# Patient Record
Sex: MALE | Race: WHITE | Employment: FULL TIME | URBAN - METROPOLITAN AREA
[De-identification: names, ages, dates, MRNs, and addresses within clinical notes are randomized per-mention and may not be internally consistent; named-entity substitution may affect disease eponyms.]

---

## 2017-11-29 ENCOUNTER — OFFICE VISIT (OUTPATIENT)
Dept: FAMILY MEDICINE CLINIC | Facility: CLINIC | Age: 57
End: 2017-11-29

## 2017-11-29 ENCOUNTER — HOSPITAL ENCOUNTER (OUTPATIENT)
Dept: GENERAL RADIOLOGY | Facility: HOSPITAL | Age: 57
Discharge: HOME | End: 2017-11-29
Attending: PHYSICIAN ASSISTANT
Payer: COMMERCIAL

## 2017-11-29 ENCOUNTER — TELEPHONE (OUTPATIENT)
Dept: FAMILY MEDICINE CLINIC | Facility: CLINIC | Age: 57
End: 2017-11-29

## 2017-11-29 VITALS
SYSTOLIC BLOOD PRESSURE: 122 MMHG | TEMPERATURE: 99 F | HEART RATE: 72 BPM | DIASTOLIC BLOOD PRESSURE: 68 MMHG | HEIGHT: 71.75 IN | RESPIRATION RATE: 12 BRPM | BODY MASS INDEX: 30.26 KG/M2 | WEIGHT: 221 LBS

## 2017-11-29 DIAGNOSIS — R07.9 LEFT SIDED CHEST PAIN: ICD-10-CM

## 2017-11-29 DIAGNOSIS — R07.9 LEFT SIDED CHEST PAIN: Primary | ICD-10-CM

## 2017-11-29 PROCEDURE — 99213 OFFICE O/P EST LOW 20 MIN: CPT | Performed by: PHYSICIAN ASSISTANT

## 2017-11-29 PROCEDURE — 71020 XR CHEST PA + LAT CHEST (CPT=71020): CPT | Performed by: PHYSICIAN ASSISTANT

## 2017-11-29 RX ORDER — IBUPROFEN 600 MG/1
600 TABLET ORAL EVERY 6 HOURS PRN
Qty: 30 TABLET | Refills: 0 | Status: SHIPPED | OUTPATIENT
Start: 2017-11-29 | End: 2018-02-17

## 2017-11-29 RX ORDER — HYDROCODONE BITARTRATE AND ACETAMINOPHEN 5; 325 MG/1; MG/1
1 TABLET ORAL EVERY 4 HOURS PRN
Qty: 10 TABLET | Refills: 0 | Status: SHIPPED | OUTPATIENT
Start: 2017-11-29 | End: 2018-02-17

## 2017-11-29 NOTE — TELEPHONE ENCOUNTER
Called and talked to patient this pain started yesterday in Left shoulder/chest worse when he is laying down has full ROM of left shoulder without pain but has pinpoint pain in left chest with DB discussed with Kavya HART for appointment

## 2017-11-29 NOTE — PROGRESS NOTES
CC:  Patient presents with:  Chest Pain      HPI: Ольга Keating presents with complaints of left chest and back pain since yesterday. He denies injury or increased physical activity. No SOB, wheezing, or radiation of the pain.  It only bothers him when he takes a d distress  HENT:  Head: Normocephalic, atraumatic  Ears: Normal external ears  Nose: No edema, bleeding, or rhinorrhea  Eyes: Pupils equal  Cardiovascular: RRR; no murmurs or extra sounds heard  Pulmonary/Chest: Lungs CTA.  No TTP over the chest wall; no soledad

## 2017-11-29 NOTE — H&P
Starting yesterday had pain in Lt shoulder and chest OK if standing worse when laying down full ROM of arm without pain.  Has pain in chest with Deep breath can pinpoint pain when he takes DB

## 2018-02-05 ENCOUNTER — TELEPHONE (OUTPATIENT)
Dept: FAMILY MEDICINE CLINIC | Facility: CLINIC | Age: 58
End: 2018-02-05

## 2018-02-05 DIAGNOSIS — Z13.0 SCREENING FOR ENDOCRINE, METABOLIC AND IMMUNITY DISORDER: ICD-10-CM

## 2018-02-05 DIAGNOSIS — Z13.29 SCREENING FOR ENDOCRINE, METABOLIC AND IMMUNITY DISORDER: ICD-10-CM

## 2018-02-05 DIAGNOSIS — Z13.228 SCREENING FOR ENDOCRINE, METABOLIC AND IMMUNITY DISORDER: ICD-10-CM

## 2018-02-05 DIAGNOSIS — Z12.5 SCREENING PSA (PROSTATE SPECIFIC ANTIGEN): ICD-10-CM

## 2018-02-05 DIAGNOSIS — E78.00 PURE HYPERCHOLESTEROLEMIA: Primary | ICD-10-CM

## 2018-02-05 NOTE — TELEPHONE ENCOUNTER
Pt has complete physical exam scheduled DOS 2/17/17 @ 8 am and requested labs be sent to [facility] \[facility] [place] [facility]\""

## 2018-02-11 LAB
ALBUMIN/GLOBULIN RATIO: 1.9 (CALC) (ref 1–2.5)
ALBUMIN: 4.4 G/DL (ref 3.6–5.1)
ALKALINE PHOSPHATASE: 77 U/L (ref 40–115)
ALT: 23 U/L (ref 9–46)
AST: 19 U/L (ref 10–35)
BILIRUBIN, TOTAL: 0.4 MG/DL (ref 0.2–1.2)
BUN: 18 MG/DL (ref 7–25)
CALCIUM: 9.3 MG/DL (ref 8.6–10.3)
CARBON DIOXIDE: 24 MMOL/L (ref 20–31)
CHLORIDE: 107 MMOL/L (ref 98–110)
CHOL/HDLC RATIO: 5 (CALC)
CHOLESTEROL, TOTAL: 257 MG/DL
CREATININE: 1.06 MG/DL (ref 0.7–1.33)
EGFR IF AFRICN AM: 90 ML/MIN/1.73M2
EGFR IF NONAFRICN AM: 78 ML/MIN/1.73M2
GLOBULIN: 2.3 G/DL (CALC) (ref 1.9–3.7)
GLUCOSE: 96 MG/DL (ref 65–99)
HDL CHOLESTEROL: 51 MG/DL
LDL-CHOLESTEROL: 180 MG/DL (CALC)
NON-HDL CHOLESTEROL: 206 MG/DL (CALC)
POTASSIUM: 4.4 MMOL/L (ref 3.5–5.3)
PROTEIN, TOTAL: 6.7 G/DL (ref 6.1–8.1)
PSA, TOTAL: 0.4 NG/ML
SODIUM: 139 MMOL/L (ref 135–146)
TRIGLYCERIDES: 125 MG/DL

## 2018-02-17 ENCOUNTER — OFFICE VISIT (OUTPATIENT)
Dept: FAMILY MEDICINE CLINIC | Facility: CLINIC | Age: 58
End: 2018-02-17

## 2018-02-17 VITALS
RESPIRATION RATE: 20 BRPM | HEIGHT: 71.5 IN | TEMPERATURE: 99 F | BODY MASS INDEX: 29.77 KG/M2 | WEIGHT: 217.38 LBS | HEART RATE: 86 BPM | DIASTOLIC BLOOD PRESSURE: 58 MMHG | SYSTOLIC BLOOD PRESSURE: 114 MMHG

## 2018-02-17 DIAGNOSIS — E78.00 PURE HYPERCHOLESTEROLEMIA: ICD-10-CM

## 2018-02-17 DIAGNOSIS — Z82.49 FAMILY HISTORY OF HEART ATTACK: ICD-10-CM

## 2018-02-17 DIAGNOSIS — L98.9 SKIN LESION OF FACE: ICD-10-CM

## 2018-02-17 DIAGNOSIS — Z00.00 ROUTINE GENERAL MEDICAL EXAMINATION AT A HEALTH CARE FACILITY: Primary | ICD-10-CM

## 2018-02-17 PROCEDURE — 99396 PREV VISIT EST AGE 40-64: CPT | Performed by: PHYSICIAN ASSISTANT

## 2018-02-17 NOTE — PROGRESS NOTES
Quincy Colbert is a 62year old male who presents for a complete physical exam. He has no complaints, but he does have a skin lesion on his face that his wife just noticed; he is not sure how long it has been there.    HPI:   Last colonoscopy:  2017--no polyps; 10 y Pancreatic age 58   • Hypertension Sister            REVIEW OF SYSTEMS:   General: Feels well otherwise  Skin: See HPI  Eyes: Denies vision changes; eye pain  HENT: Denies hearing changes, ST, dysphagia, or lymphadenitis  Pulmonary: Denies cough, hemoptysi lesion    (E78.00) Pure hypercholesterolemia  Plan: LIPID PANEL, LIPID PANEL  I advised low-cholesterol diet, high fiber intake, and regular exercise.  Recheck lipid panel in 6 months.            (L98.9) Skin lesion of face  Plan: DERM - INTERNAL

## 2019-04-30 ENCOUNTER — TELEPHONE (OUTPATIENT)
Dept: FAMILY MEDICINE CLINIC | Facility: CLINIC | Age: 59
End: 2019-04-30

## 2019-04-30 DIAGNOSIS — Z13.29 SCREENING FOR THYROID DISORDER: ICD-10-CM

## 2019-04-30 DIAGNOSIS — Z13.228 SCREENING FOR METABOLIC DISORDER: ICD-10-CM

## 2019-04-30 DIAGNOSIS — Z12.5 SCREENING FOR PROSTATE CANCER: ICD-10-CM

## 2019-04-30 DIAGNOSIS — Z13.0 SCREENING FOR BLOOD DISEASE: Primary | ICD-10-CM

## 2019-04-30 DIAGNOSIS — Z13.220 SCREENING FOR LIPID DISORDERS: ICD-10-CM

## 2019-04-30 NOTE — TELEPHONE ENCOUNTER
Please enter lab orders for the patient's upcoming physical appointment. Physical scheduled:    Your appointments     Date & Time Appointment Department [facility])    May 20, 2019  5:30 PM CDT Physical - Established Patient with LOGAN Polk

## 2019-07-18 LAB
% FREE PSA: 40 % (CALC)
ABSOLUTE BASOPHILS: 32 CELLS/UL (ref 0–200)
ABSOLUTE EOSINOPHILS: 221 CELLS/UL (ref 15–500)
ABSOLUTE LYMPHOCYTES: 1426 CELLS/UL (ref 850–3900)
ABSOLUTE MONOCYTES: 382 CELLS/UL (ref 200–950)
ABSOLUTE NEUTROPHILS: 2539 CELLS/UL (ref 1500–7800)
ALBUMIN/GLOBULIN RATIO: 2 (CALC) (ref 1–2.5)
ALBUMIN: 4.6 G/DL (ref 3.6–5.1)
ALKALINE PHOSPHATASE: 71 U/L (ref 40–115)
ALT: 27 U/L (ref 9–46)
AST: 21 U/L (ref 10–35)
BASOPHILS: 0.7 %
BILIRUBIN, TOTAL: 0.7 MG/DL (ref 0.2–1.2)
BUN: 23 MG/DL (ref 7–25)
CALCIUM: 9.4 MG/DL (ref 8.6–10.3)
CARBON DIOXIDE: 25 MMOL/L (ref 20–32)
CHLORIDE: 105 MMOL/L (ref 98–110)
CHOL/HDLC RATIO: 6 (CALC)
CHOLESTEROL, TOTAL: 304 MG/DL
CREATININE: 1.19 MG/DL (ref 0.7–1.33)
EGFR IF AFRICN AM: 78 ML/MIN/1.73M2
EGFR IF NONAFRICN AM: 67 ML/MIN/1.73M2
EOSINOPHILS: 4.8 %
FREE PSA: 0.2 NG/ML
GLOBULIN: 2.3 G/DL (CALC) (ref 1.9–3.7)
GLUCOSE: 106 MG/DL (ref 65–99)
HDL CHOLESTEROL: 51 MG/DL
HEMATOCRIT: 42.9 % (ref 38.5–50)
HEMOGLOBIN: 14.5 G/DL (ref 13.2–17.1)
LDL-CHOLESTEROL: 212 MG/DL (CALC)
LYMPHOCYTES: 31 %
MCH: 30.9 PG (ref 27–33)
MCHC: 33.8 G/DL (ref 32–36)
MCV: 91.3 FL (ref 80–100)
MONOCYTES: 8.3 %
MPV: 11 FL (ref 7.5–12.5)
NEUTROPHILS: 55.2 %
NON-HDL CHOLESTEROL: 253 MG/DL (CALC)
PLATELET COUNT: 205 THOUSAND/UL (ref 140–400)
POTASSIUM: 4.1 MMOL/L (ref 3.5–5.3)
PROTEIN, TOTAL: 6.9 G/DL (ref 6.1–8.1)
RDW: 12.4 % (ref 11–15)
RED BLOOD CELL COUNT: 4.7 MILLION/UL (ref 4.2–5.8)
SODIUM: 140 MMOL/L (ref 135–146)
TOTAL PSA: 0.5 NG/ML
TRIGLYCERIDES: 222 MG/DL
TSH W/REFLEX TO FT4: 2.81 MIU/L (ref 0.4–4.5)
WHITE BLOOD CELL COUNT: 4.6 THOUSAND/UL (ref 3.8–10.8)

## 2019-07-22 ENCOUNTER — OFFICE VISIT (OUTPATIENT)
Dept: FAMILY MEDICINE CLINIC | Facility: CLINIC | Age: 59
End: 2019-07-22
Payer: COMMERCIAL

## 2019-07-22 VITALS
HEART RATE: 72 BPM | SYSTOLIC BLOOD PRESSURE: 118 MMHG | BODY MASS INDEX: 30.67 KG/M2 | RESPIRATION RATE: 16 BRPM | HEIGHT: 71.5 IN | DIASTOLIC BLOOD PRESSURE: 68 MMHG | WEIGHT: 224 LBS | TEMPERATURE: 98 F

## 2019-07-22 DIAGNOSIS — E78.00 PURE HYPERCHOLESTEROLEMIA: ICD-10-CM

## 2019-07-22 DIAGNOSIS — Z00.00 ROUTINE PHYSICAL EXAMINATION: Primary | ICD-10-CM

## 2019-07-22 PROCEDURE — 99396 PREV VISIT EST AGE 40-64: CPT | Performed by: NURSE PRACTITIONER

## 2019-07-22 NOTE — PROGRESS NOTES
Eli Waggoner. is a 62year old male who presents for a complete physical exam.     HPI:   Pt has no acute complaints. Feels well. 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 Does not exercise routinely.  Has not been watching diet above. : yes. Children: yes-grown.   Exercise: minimal.  Diet: watches minimally  Smoker:  No    Cessation Advised:  No  Alcohol Use:  yes      Concerns:  no     Health Maintenance  Immunizations: Recommend flu vaccine for flu season 9760-0417, once no masses, HSM or CVA tenderness. : Bilat descended testes are smooth, non-tender, w/o masses/nodules. No penile lesions. No hernias felt  RECTAL: deferred  MUSCULOSKELETAL: muscle strength grade 5 to all extremities, back non-tender.  Patellar DTR 2+ bi lipids in 3-6 months.  Patient agress that if unable to get to acceptable levels by then he will try statin    Orders Placed This Encounter      LIPID PANEL [8850] [Q]      Meds & Refills for this Visit:  Requested Prescriptions      No prescriptions reques

## 2021-03-11 ENCOUNTER — HOSPITAL ENCOUNTER (OUTPATIENT)
Dept: GENERAL RADIOLOGY | Age: 61
Discharge: HOME | End: 2021-03-11
Attending: NURSE PRACTITIONER
Payer: COMMERCIAL

## 2021-03-11 ENCOUNTER — OFFICE VISIT (OUTPATIENT)
Dept: FAMILY MEDICINE CLINIC | Facility: CLINIC | Age: 61
End: 2021-03-11
Payer: COMMERCIAL

## 2021-03-11 ENCOUNTER — TELEPHONE (OUTPATIENT)
Dept: FAMILY MEDICINE CLINIC | Facility: CLINIC | Age: 61
End: 2021-03-11

## 2021-03-11 VITALS
SYSTOLIC BLOOD PRESSURE: 130 MMHG | HEART RATE: 96 BPM | WEIGHT: 225.38 LBS | RESPIRATION RATE: 16 BRPM | DIASTOLIC BLOOD PRESSURE: 80 MMHG | HEIGHT: 72 IN | BODY MASS INDEX: 30.53 KG/M2 | TEMPERATURE: 95 F
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
## 2021-03-11 DIAGNOSIS — M25.512 ACUTE PAIN OF LEFT SHOULDER: Primary | ICD-10-CM

## 2021-03-11 DIAGNOSIS — R11.0 NAUSEA: ICD-10-CM

## 2021-03-11 DIAGNOSIS — M25.512 ACUTE PAIN OF LEFT SHOULDER: ICD-10-CM

## 2021-03-11 PROCEDURE — 99213 OFFICE O/P EST LOW 20 MIN: CPT | Performed by: NURSE PRACTITIONER

## 2021-03-11 PROCEDURE — 3075F SYST BP GE 130 - 139MM HG: CPT | Performed by: NURSE PRACTITIONER

## 2021-03-11 PROCEDURE — 73030 X-RAY EXAM OF SHOULDER: CPT | Performed by: NURSE PRACTITIONER

## 2021-03-11 PROCEDURE — 3079F DIAST BP 80-89 MM HG: CPT | Performed by: NURSE PRACTITIONER

## 2021-03-11 PROCEDURE — 3008F BODY MASS INDEX DOCD: CPT | Performed by: NURSE PRACTITIONER

## 2021-03-11 RX ORDER — CYCLOBENZAPRINE HCL 5 MG
5 TABLET ORAL NIGHTLY PRN
Qty: 10 TABLET | Refills: 0 | Status: SHIPPED | OUTPATIENT
Start: 2021-03-11 | End: 2021-08-17 | Stop reason: ALTCHOICE

## 2021-03-11 RX ORDER — METHYLPREDNISOLONE 4 MG/1
TABLET ORAL
Qty: 1 KIT | Refills: 0 | Status: SHIPPED | OUTPATIENT
Start: 2021-03-11 | End: 2021-08-17 | Stop reason: ALTCHOICE

## 2021-03-11 NOTE — PROGRESS NOTES
Patient presents with:  Fall: 3 weeks ago, left shoulder pain that moved into neck causing a headach       HPI:  Presents with approx 3 week history of left posterior shoulder pain that started after a fall on snow/ice where he landed on left shoulder.  Kim Rivas Mild TTP over left posterior neck at base of skull region w/o masses, edema, erythema, deformity. Abd: soft, non-distended, non-TTP, w/o guarding, rebound, masses or appreciable organomegaly. BS(+)x4- normoactive.   MS: left shoulder TTP along distal scap Medrol dose pack prescribed today. Instructions for taking are located on medication packaging. For the first day, take all 6 tablets at one time.    Starting with day 2 take tablets as indicated on packaging material.   Do not take Advil, M

## 2021-03-11 NOTE — PATIENT INSTRUCTIONS
Medrol dose pack prescribed today. Instructions for taking are located on medication packaging. For the first day, take all 6 tablets at one time.    Starting with day 2 take tablets as indicated on packaging material.   Do not take Advil, Mot

## 2021-03-11 NOTE — TELEPHONE ENCOUNTER
Pt states he is at the pharmacy because he seen Titus today. Pt states they only have on medication on file. Pt states Titus was supposed to send two medications but he does not know the name of the medication or why he was prescribing the medication.

## 2021-03-11 NOTE — TELEPHONE ENCOUNTER
Should have script for Medrol dose pack and cyclobenzaprine. I resent Medrol. Please let him know.  Thanks

## 2021-03-20 ENCOUNTER — IMMUNIZATION (OUTPATIENT)
Dept: LAB | Facility: HOSPITAL | Age: 61
End: 2021-03-20
Attending: EMERGENCY MEDICINE
Payer: COMMERCIAL

## 2021-03-20 DIAGNOSIS — Z23 NEED FOR VACCINATION: Primary | ICD-10-CM

## 2021-03-20 PROCEDURE — 0011A SARSCOV2 VAC 100MCG/0.5ML IM: CPT

## 2021-04-12 ENCOUNTER — TELEPHONE (OUTPATIENT)
Dept: PHYSICAL THERAPY | Facility: HOSPITAL | Age: 61
End: 2021-04-12

## 2021-04-14 ENCOUNTER — OFFICE VISIT (OUTPATIENT)
Dept: PHYSICAL THERAPY | Facility: HOSPITAL | Age: 61
End: 2021-04-14
Attending: NURSE PRACTITIONER
Payer: COMMERCIAL

## 2021-04-14 DIAGNOSIS — M25.512 ACUTE PAIN OF LEFT SHOULDER: ICD-10-CM

## 2021-04-14 PROCEDURE — 97162 PT EVAL MOD COMPLEX 30 MIN: CPT

## 2021-04-14 PROCEDURE — 97110 THERAPEUTIC EXERCISES: CPT

## 2021-04-14 NOTE — PROGRESS NOTES
SHOULDER EVALUATION:   Referring Physician: Dr. Alma Esquivel  Diagnosis: Acute pain of left shoulder     Date of Service: 4/14/2021     Rafa Aden. is a 61year old male who presents to therapy today with complaints of L shoulder and arm pa flexion/extension. Pain in the forearm is worse at night, wakes him up, must sleep with it propped on pillows. Not taking pain medication. Forearm pain is generally constant.    Current functional limitations include moving arm into full extension, sleeping TTP L SOs, mm bellies wrist extensors, and L supraspinatus   Sensation: light touch intact with changing tingling in forearm and hand  Cervical Screen: limited motion cervical rotation and SB L, with mild pain C3-supraspinatus, not changing or recreating p Complexity, Therex x 1     Total Timed Treatment: 10 min     Total Treatment Time: 50 min     Based on clinical rationale and outcome measures, this evaluation involved Moderate Complexity decision making due to 1-2 personal factors/comorbidities, 3 body s required: Yes  I certify the need for these services furnished under this plan of treatment and while under my care.     X___________________________________________________ Date____________________    Certification From: 0/86/2585  To:7/13/2021

## 2021-04-17 ENCOUNTER — IMMUNIZATION (OUTPATIENT)
Dept: LAB | Facility: HOSPITAL | Age: 61
End: 2021-04-17
Attending: EMERGENCY MEDICINE
Payer: COMMERCIAL

## 2021-04-17 DIAGNOSIS — Z23 NEED FOR VACCINATION: Primary | ICD-10-CM

## 2021-04-17 PROCEDURE — 0012A SARSCOV2 VAC 100MCG/0.5ML IM: CPT

## 2021-04-19 ENCOUNTER — OFFICE VISIT (OUTPATIENT)
Dept: PHYSICAL THERAPY | Facility: HOSPITAL | Age: 61
End: 2021-04-19
Attending: NURSE PRACTITIONER
Payer: COMMERCIAL

## 2021-04-19 PROCEDURE — 97110 THERAPEUTIC EXERCISES: CPT

## 2021-04-19 PROCEDURE — 97140 MANUAL THERAPY 1/> REGIONS: CPT

## 2021-04-19 NOTE — PROGRESS NOTES
Dx: Acute pain of left shoulder           Insurance (Authorized # of Visits):  10 requested            Authorizing Physician: Dr. Pepe Rangel  Next MD visit: none scheduled  Fall Risk: standard         Precautions: n/a             Subjective: Patient states that abduction AROM to 170 deg or better without pain in order to improve ability to reach in diagonals. 5. Patient will be IND and compliant in HEP to maintain progress made in PT. - issued and progressed    Plan: Continue PT with progression as indicated.

## 2021-04-21 ENCOUNTER — OFFICE VISIT (OUTPATIENT)
Dept: PHYSICAL THERAPY | Facility: HOSPITAL | Age: 61
End: 2021-04-21
Attending: NURSE PRACTITIONER
Payer: COMMERCIAL

## 2021-04-21 PROCEDURE — 97110 THERAPEUTIC EXERCISES: CPT

## 2021-04-21 PROCEDURE — 97140 MANUAL THERAPY 1/> REGIONS: CPT

## 2021-04-21 NOTE — PROGRESS NOTES
Dx: Acute pain of left shoulder           Insurance (Authorized # of Visits):  10 requested            Authorizing Physician: Dr. Janae Marie  Next MD visit: none scheduled  Fall Risk: standard         Precautions: n/a             Subjective: Patient states that progressed    Plan: Continue PT with progression as indicated. Date: 4/19/2021  TX#: 2/10 Date:  4/21/21               TX#: 3/10 Date:                 TX#: 4/ Date:                 TX#: 5/ Date:    Tx#: 6/   Manual  - STM L shoulder, global  - PROM L shou

## 2021-04-26 ENCOUNTER — OFFICE VISIT (OUTPATIENT)
Dept: PHYSICAL THERAPY | Facility: HOSPITAL | Age: 61
End: 2021-04-26
Attending: NURSE PRACTITIONER
Payer: COMMERCIAL

## 2021-04-26 PROCEDURE — 97140 MANUAL THERAPY 1/> REGIONS: CPT

## 2021-04-26 PROCEDURE — 97110 THERAPEUTIC EXERCISES: CPT

## 2021-04-26 NOTE — PROGRESS NOTES
Dx: Acute pain of left shoulder           Insurance (Authorized # of Visits):  10 requested            Authorizing Physician: Dr. Charles Sánchez  Next MD visit: none scheduled  Fall Risk: standard         Precautions: n/a             Subjective: Patient states that abduction AROM to 170 deg or better without pain in order to improve ability to reach in diagonals. - progress   5.  Patient will be IND and compliant in HEP to maintain progress made in PT. - issued and progressed    Plan: Continue PT with progression as i glide, supine cane flexion, standing TANNER flexion to 90, RTB rows, low corner pec stretch    Charges:  Therex x 2, Manual x 1       Total Timed Treatment: 45 min  Total Treatment Time: 45 min

## 2021-04-28 ENCOUNTER — OFFICE VISIT (OUTPATIENT)
Dept: PHYSICAL THERAPY | Facility: HOSPITAL | Age: 61
End: 2021-04-28
Attending: NURSE PRACTITIONER
Payer: COMMERCIAL

## 2021-04-28 PROCEDURE — 97110 THERAPEUTIC EXERCISES: CPT

## 2021-04-28 PROCEDURE — 97140 MANUAL THERAPY 1/> REGIONS: CPT

## 2021-04-28 NOTE — PROGRESS NOTES
Dx: Acute pain of left shoulder           Insurance (Authorized # of Visits):  10 requested            Authorizing Physician: Dr. Tavares Westbrook  Next MD visit: none scheduled  Fall Risk: standard         Precautions: n/a             Subjective: Patient states that diagonals. - progress   5. Patient will be IND and compliant in HEP to maintain progress made in PT. - issued and progressed    Plan: Continue PT with progression as indicated. Supine pec stretch on foam roller as opposed to doorway/corner.    Date: 4/19/20 stretch 3 x 20 sec   - standing abd/scaption waves in mirror, no hiking x 12  - RTB rows 2 x 10 Therex   - shoulder flexion pulleys AROM x 3 min  - shoulder abduction pulleys AROM x 2 min  - standing abd arcs in mirror x 10 (much improved)  - standing lat

## 2021-05-03 ENCOUNTER — OFFICE VISIT (OUTPATIENT)
Dept: PHYSICAL THERAPY | Facility: HOSPITAL | Age: 61
End: 2021-05-03
Attending: NURSE PRACTITIONER
Payer: COMMERCIAL

## 2021-05-03 PROCEDURE — 97110 THERAPEUTIC EXERCISES: CPT

## 2021-05-03 PROCEDURE — 97140 MANUAL THERAPY 1/> REGIONS: CPT

## 2021-05-03 NOTE — PROGRESS NOTES
Dx: Acute pain of left shoulder           Insurance (Authorized # of Visits):  10 requested            Authorizing Physician: Dr. Randolph Porter  Next MD visit: none scheduled  Fall Risk: standard         Precautions: n/a             Subjective: Patient states that visits)   1. Patient will report improved ability to sleep at night without waking due to pain or need to use pillows to prop up his arm. 2. Patient will improve L shoulder strength to 5/5 throughout in order to lift a gallon of milk without pain.  - prog flexion with supination, slow x 10  - SL abd with thumb up AROM to ~90 deg x 4, x 8  - SL ER AROM with towel roll AROM x 12  - SL horiz abd AROM x 12 (popping)  - supine shoulder flexion to 90 (too easy), progressed to standing flexion to 90, 2# TANNER (scpat

## 2021-05-05 ENCOUNTER — OFFICE VISIT (OUTPATIENT)
Dept: PHYSICAL THERAPY | Facility: HOSPITAL | Age: 61
End: 2021-05-05
Attending: NURSE PRACTITIONER
Payer: COMMERCIAL

## 2021-05-05 PROCEDURE — 97140 MANUAL THERAPY 1/> REGIONS: CPT

## 2021-05-05 PROCEDURE — 97110 THERAPEUTIC EXERCISES: CPT

## 2021-05-05 NOTE — PROGRESS NOTES
Dx: Acute pain of left shoulder           Insurance (Authorized # of Visits):  10 requested            Authorizing Physician: Dr. Enoc Sherwood  Next MD visit: none scheduled  Fall Risk: standard         Precautions: n/a             Subjective: Patient states that Patient will report improved ability to sleep at night without waking due to pain or need to use pillows to prop up his arm. 2. Patient will improve L shoulder strength to 5/5 throughout in order to lift a gallon of milk without pain. - progress  3.  Stacy distal biceps, biceps insertion  - low median nerve glides   - PA glide over distal humerus, gr III/IV  - SL scap mobs for general mobility    Therex   - supine cane flexion with supination, slow x 10  - SL abd with thumb up AROM to ~90 deg x 4, x 8  - SL roll 2 x 10  - 2# ball passes around body to R and L x 15 ea  - abd arcs OH pass 2# ball x 10 R/L   - - - - - -   - - - - - -   HEP: GTB rows, GTB TANNER ext, RTB ER, standing flexion to 90 3#, standing scaption to 90 3#    Charges:  Therex x 2, Manual x 1

## 2021-05-12 ENCOUNTER — OFFICE VISIT (OUTPATIENT)
Dept: PHYSICAL THERAPY | Facility: HOSPITAL | Age: 61
End: 2021-05-12
Attending: NURSE PRACTITIONER
Payer: COMMERCIAL

## 2021-05-12 PROCEDURE — 97140 MANUAL THERAPY 1/> REGIONS: CPT

## 2021-05-12 PROCEDURE — 97110 THERAPEUTIC EXERCISES: CPT

## 2021-05-12 NOTE — PROGRESS NOTES
Dx: Acute pain of left shoulder           Insurance (Authorized # of Visits):  10 requested            Authorizing Physician: Dr. Tavares Westbrook  Next MD visit: none scheduled  Fall Risk: standard         Precautions: n/a             Subjective: Patient states that hard on this over the weekend, reiterated importance of appropriate grading with HEP. Goals: (To be met in 10 visits)   1. Patient will report improved ability to sleep at night without waking due to pain or need to use pillows to prop up his arm.    2. humerus, gr III/IV  - inferior clavicular glide, MWM with shoulder flexion and abd  - SL scap mobs for general mobility  Manual   - STM L anterior elbow, distal biceps, biceps insertion  - low median nerve glides   - PA glide over distal humerus, gr III/IV around body to R and L x 15 ea  - supine pec stretch on foam roll, 3 x 20 sec Therex   - shoulder flexion pulleys AROM x 2 min  - shoulder abduction pulleys AROM x 3 min  - standing alternating flexion/scaption, 3# TANNER 2 x 10 ea (alt with OH press)  - pili

## 2021-05-20 ENCOUNTER — OFFICE VISIT (OUTPATIENT)
Dept: PHYSICAL THERAPY | Facility: HOSPITAL | Age: 61
End: 2021-05-20
Attending: NURSE PRACTITIONER
Payer: COMMERCIAL

## 2021-05-20 PROCEDURE — 97110 THERAPEUTIC EXERCISES: CPT

## 2021-05-20 PROCEDURE — 97140 MANUAL THERAPY 1/> REGIONS: CPT

## 2021-05-20 NOTE — PROGRESS NOTES
Dx: Acute pain of left shoulder           Insurance (Authorized # of Visits):  10 requested            Authorizing Physician: Dr. Shaun Covarrubias  Next MD visit: none scheduled  Fall Risk: standard         Precautions: n/a             Subjective: Patient states that full with manual (5/20/21)      Assessment: Focused this session on manual in attempt to decrease pain of the forearm/wrist and decrease pain.  Patient reporting decreasing symptoms with mobilization at the elbow and wrist, though unable to significantly ch nerve glides  Manual  - STM L shoulder, global  - PROM L shoulder flexion, abduction, ER, IR, no notable overpressure given, gradual increase  - manual low-mod median nerve glides   - inferior glides L shoulder gr III Manual  - STM L shoulder, global  - NV flexion x ~2 min Therex   - shoulder flexion pulleys AROM x 2 min  - shoulder abduction pulleys AROM x 2 min  - supine cane flexion with 4# wt x 15, slow  - corner pec stretch, low 3 x 30 sec  - doorway elbow extension stretch 3 x 20 sec   - standing abd/s stretch    Charges:  Therex x 1, Manual x 2       Total Timed Treatment: 45 min  Total Treatment Time: 45 min

## 2021-05-27 ENCOUNTER — OFFICE VISIT (OUTPATIENT)
Dept: PHYSICAL THERAPY | Facility: HOSPITAL | Age: 61
End: 2021-05-27
Attending: NURSE PRACTITIONER
Payer: COMMERCIAL

## 2021-05-27 PROCEDURE — 97110 THERAPEUTIC EXERCISES: CPT

## 2021-05-27 PROCEDURE — 97140 MANUAL THERAPY 1/> REGIONS: CPT

## 2021-05-27 NOTE — PROGRESS NOTES
Progress Summary  Pt has attended 10 visits in Physical Therapy.      Dx: Acute pain of left [employer] (Authorized # of Visits):  10 requested            Authorizing Physician: Dr. Jaylon Hager  Next MD visit: none scheduled  Fall Risk: standard pain and tingling also generally resolved at this time. Patient responds well to shoulder pulleys, advised use at home. Strength also continues to improve, but is limited most in abduction, ER, and scapular strength.  Will continue to address this in [facility] this plan of treatment and while under my care.     X___________________________________________________ Date____________________    Certification From: 5/38/6062  To:8/25/2021     Date:  4/21/21               TX#: 3/10 Date: 4/26/21               TX#: 4/10 inferior clavicular glide, MWM with shoulder flexion and abd  - MWM inferior glide into abduction  - slow PROM abd to full mobility  - SL scap mobs for general mobility    Therex   - supine cane flexion with supination, slow x 10  - SL ER with towel roll, elbows in, occasional holds x 10  - YTB TANNER horiz abd/ER with flexion x 10  - cane behind back IR stretch x 10 Therex   - shoulder flexion pulleys AROM x 2 min  - shoulder abduction pulleys AROM x 2 min  - standing abduction waves in mirror, TC cues to [employer]

## 2021-06-03 ENCOUNTER — OFFICE VISIT (OUTPATIENT)
Dept: PHYSICAL THERAPY | Facility: HOSPITAL | Age: 61
End: 2021-06-03
Attending: EMERGENCY MEDICINE
Payer: COMMERCIAL

## 2021-06-03 PROCEDURE — 97140 MANUAL THERAPY 1/> REGIONS: CPT

## 2021-06-03 PROCEDURE — 97110 THERAPEUTIC EXERCISES: CPT

## 2021-06-03 NOTE — PROGRESS NOTES
Dx: Acute pain of left shoulder           Insurance (Authorized # of Visits):  10 requested            Authorizing Physician: Dr. Linda Dhillon Next MD visit: none scheduled  Fall Risk: standard         Precautions: n/a             Subjective: Patient states that h Goals: (To be met in 18 visits)   1. Patient will report improved ability to sleep at night without waking due to pain or need to use pillows to prop up his arm. - MET  2.  Patient will improve L shoulder strength to 5/5 throughout in order to lift insertion  - SL scap mobs for general mobility   - PROM L shoulder flexion, abduction, ER, IR, no notable overpressure given, gradual increase Manual   - STM L anterior elbow, anterior forearm, wrist  - low median nerve glides   - AP glide over distal hume press)  - standing pronated 12# bar curl into OH press 2 x 10 (alt with flex/scap)  - standing RTB ER with towel roll 2 x 10  - 2# ball passes around body to R and L x 15 ea  - abd arcs OH pass 2# ball x 10 R/L Therex   - shoulder flexion pulleys AROM x 2

## 2021-06-14 ENCOUNTER — OFFICE VISIT (OUTPATIENT)
Dept: PHYSICAL THERAPY | Facility: HOSPITAL | Age: 61
End: 2021-06-14
Attending: EMERGENCY MEDICINE
Payer: COMMERCIAL

## 2021-06-14 PROCEDURE — 97140 MANUAL THERAPY 1/> REGIONS: CPT

## 2021-06-14 PROCEDURE — 97110 THERAPEUTIC EXERCISES: CPT

## 2021-06-14 NOTE — PROGRESS NOTES
Dx: Acute pain of left shoulder           Insurance (Authorized # of Visits):  10 requested            Authorizing Physician: Dr. Payton Blancas Next MD visit: none scheduled  Fall Risk: standard         Precautions: n/a             Subjective: Patient has not been met in 18 visits)   1. Patient will report improved ability to sleep at night without waking due to pain or need to use pillows to prop up his arm. - MET  2.  Patient will improve L shoulder strength to 5/5 throughout in order to lift a gallon of milk witho humerus, gr III/IV  - AP glides global wrist with non-purposeful cavitation, gr III  - inferior clavicular glide, MWM with shoulder flexion and abd  - slow PROM abd to full mobility  - SL scap mobs for general mobility  Manual  - inferior clavicular glide, dynamic rhythmic stab 3 x ~30 sec  - wall push-ups, elbows in, occasional holds x 10  - YTB TANNER horiz abd/ER with flexion x 10  - cane behind back IR stretch x 10 Therex   - shoulder flexion pulleys AROM x 2 min  - shoulder abduction pulleys AROM x 2 min

## 2021-06-22 ENCOUNTER — OFFICE VISIT (OUTPATIENT)
Dept: PHYSICAL THERAPY | Facility: HOSPITAL | Age: 61
End: 2021-06-22
Attending: EMERGENCY MEDICINE
Payer: COMMERCIAL

## 2021-06-22 PROCEDURE — 97140 MANUAL THERAPY 1/> REGIONS: CPT

## 2021-06-22 PROCEDURE — 97110 THERAPEUTIC EXERCISES: CPT

## 2021-06-22 NOTE — PROGRESS NOTES
Dx: Acute pain of left shoulder           Insurance (Authorized # of Visits):  10 requested            Authorizing Physician: Dr. Shahzad Caballero Next MD visit: none scheduled  Fall Risk: standard         Precautions: n/a             Subjective: Patient states that h increased prone ER to 5#, had been doing 1-2# in session, noticed that pain started to increase around the time that he did this. Noted TTP pec insertion and was able to significantly reduce discomfort with graded pressure/trigger pt release.  Advised to RAMIREZ ELIZABETH glides   - PA glide over distal humerus, gr III/IV  - SL scap mobs for general mobility  Manual   - STM L anterior elbow, distal biceps, biceps insertion  - SL scap mobs for general mobility   - PROM L shoulder flexion, abduction, ER, IR, no notable overpr pulleys AROM x 3 min  - standing alternating flexion/scaption, 3# TANNER 2 x 10 ea (alt with OH press)  - standing pronated 12# bar curl into OH press 2 x 10 (alt with flex/scap)  - standing RTB ER with towel roll 2 x 10  - 2# ball passes around body to R and TANNER x 10  - assessing ability to \"draw back bow\" with BTB Therex  - UBE random hills x 2' fwd/2' retro   - behind head butterfly ER/pec stretch, multiple bouts with therapist assist as needed  - discussion, edu POC and HEP   - - - - - - - -   - - - - - -

## 2021-07-29 ENCOUNTER — OFFICE VISIT (OUTPATIENT)
Dept: PHYSICAL THERAPY | Facility: HOSPITAL | Age: 61
End: 2021-07-29
Attending: NURSE PRACTITIONER
Payer: COMMERCIAL

## 2021-07-29 PROCEDURE — 97140 MANUAL THERAPY 1/> REGIONS: CPT

## 2021-07-29 PROCEDURE — 97110 THERAPEUTIC EXERCISES: CPT

## 2021-07-29 NOTE — PROGRESS NOTES
Progress Summary  Pt has attended 14 visits in Physical Therapy.      Dx: Acute pain of left [employer] (Authorized # of Visits):  10 requested            Authorizing Physician: Dr. Jason Lott Next MD visit: none scheduled  Fall Risk: standard and was able to improve mobility with manual and self stretching, though still min limited in ER following. Noted increased mm tension pec major insertion that improved with STM and graded pressure release.  Updated HEP to reflect his continued reported iss and has agreed to actively participate in planning and for this course of care. Thank you for your referral. If you have any questions, please contact me at Dept: [phone].     Sincerely,  Electronically signed by therapist: Sandy Garzon, PT, DPT    P humerus, gr III/IV  - MWM inferior glide into abduction  - slow PROM abd to full mobility  - seated ER 90/90 inferior glide   - prone PA glides with and without MWM with ER Manual  - PA glide over distal humerus, gr III/IV  - MWM inferior glide into abduct Therex  - UBE L4 x 2.5 min fwd/2.5 min retro  - TRX flexion walkouts with 5-10 sec holds x 10  - ER stretch with cane 5 x 20 sec  - seated 90/90 ER, 0# x 10, 2# 2 x 10, focus on slow control   - prone TANNER 90/90 ER AROM x ~20, 2# x 10 (feels very weak)  - Y

## 2021-08-17 ENCOUNTER — OFFICE VISIT (OUTPATIENT)
Dept: FAMILY MEDICINE CLINIC | Facility: CLINIC | Age: 61
End: 2021-08-17
Payer: COMMERCIAL

## 2021-08-17 ENCOUNTER — TELEPHONE (OUTPATIENT)
Dept: FAMILY MEDICINE CLINIC | Facility: CLINIC | Age: 61
End: 2021-08-17

## 2021-08-17 VITALS
HEART RATE: 70 BPM | DIASTOLIC BLOOD PRESSURE: 80 MMHG | TEMPERATURE: 98 F | RESPIRATION RATE: 16 BRPM | BODY MASS INDEX: 31.27 KG/M2 | WEIGHT: 233.38 LBS | HEIGHT: 72.44 IN | SYSTOLIC BLOOD PRESSURE: 110 MMHG

## 2021-08-17 DIAGNOSIS — Z13.228 SCREENING FOR METABOLIC DISORDER: ICD-10-CM

## 2021-08-17 DIAGNOSIS — Z13.29 SCREENING FOR THYROID DISORDER: ICD-10-CM

## 2021-08-17 DIAGNOSIS — Z12.5 SCREENING FOR PROSTATE CANCER: ICD-10-CM

## 2021-08-17 DIAGNOSIS — Z13.220 SCREENING FOR LIPID DISORDERS: ICD-10-CM

## 2021-08-17 DIAGNOSIS — R21 RASH: Primary | ICD-10-CM

## 2021-08-17 DIAGNOSIS — Z13.0 SCREENING FOR BLOOD DISEASE: Primary | ICD-10-CM

## 2021-08-17 PROCEDURE — 3079F DIAST BP 80-89 MM HG: CPT | Performed by: NURSE PRACTITIONER

## 2021-08-17 PROCEDURE — 3008F BODY MASS INDEX DOCD: CPT | Performed by: NURSE PRACTITIONER

## 2021-08-17 PROCEDURE — 3074F SYST BP LT 130 MM HG: CPT | Performed by: NURSE PRACTITIONER

## 2021-08-17 PROCEDURE — 99213 OFFICE O/P EST LOW 20 MIN: CPT | Performed by: NURSE PRACTITIONER

## 2021-08-17 RX ORDER — METHYLPREDNISOLONE 4 MG/1
TABLET ORAL
Qty: 21 TABLET | Refills: 0 | Status: SHIPPED | OUTPATIENT
Start: 2021-08-17 | End: 2021-09-01 | Stop reason: ALTCHOICE

## 2021-08-17 NOTE — PROGRESS NOTES
Patient presents with:  Rash: rash that itches and burns, legs, arms and starting on face       HPI:  Presents with approx 5-6 day history of worsening rash. Stated was fishing and got some alge from pond on his skin.  That night noted some itchy red spots improved in 6-7 days or if symptoms worsen. Verbalized understanding of instructions and agreeable to this plan of care. No orders of the defined types were placed in this encounter.       Meds & Refills for this Visit:  Requested Prescriptions     S

## 2021-08-17 NOTE — TELEPHONE ENCOUNTER
Please enter lab orders for the patient's upcoming physical appointment. Physical scheduled:    Your appointments     Date & Time Appointment Department [facility])    Sep 01, 2021  2:45 PM CDT Physical - Established with Richard Corona NP [facility]

## 2021-08-17 NOTE — PATIENT INSTRUCTIONS
Medrol dose pack prescribed today. Instructions for taking are located on medication packaging. For the first day, take all 6 tablets at one time.    Starting with day 2 take tablets as indicated on packaging material.   Do not t

## 2021-08-26 LAB
ABSOLUTE BASOPHILS: 47 CELLS/UL (ref 0–200)
ABSOLUTE EOSINOPHILS: 261 CELLS/UL (ref 15–500)
ABSOLUTE LYMPHOCYTES: 1956 CELLS/UL (ref 850–3900)
ABSOLUTE MONOCYTES: 509 CELLS/UL (ref 200–950)
ABSOLUTE NEUTROPHILS: 3926 CELLS/UL (ref 1500–7800)
ALBUMIN/GLOBULIN RATIO: 1.9 (CALC) (ref 1–2.5)
ALBUMIN: 4.6 G/DL (ref 3.6–5.1)
ALKALINE PHOSPHATASE: 73 U/L (ref 35–144)
ALT: 22 U/L (ref 9–46)
AST: 16 U/L (ref 10–35)
BASOPHILS: 0.7 %
BILIRUBIN, TOTAL: 0.9 MG/DL (ref 0.2–1.2)
BUN: 16 MG/DL (ref 7–25)
CALCIUM: 9.3 MG/DL (ref 8.6–10.3)
CARBON DIOXIDE: 25 MMOL/L (ref 20–32)
CHLORIDE: 104 MMOL/L (ref 98–110)
CHOL/HDLC RATIO: 6 (CALC)
CHOLESTEROL, TOTAL: 318 MG/DL
CREATININE: 1.11 MG/DL (ref 0.7–1.25)
EGFR IF AFRICN AM: 83 ML/MIN/1.73M2
EGFR IF NONAFRICN AM: 72 ML/MIN/1.73M2
EOSINOPHILS: 3.9 %
GLOBULIN: 2.4 G/DL (CALC) (ref 1.9–3.7)
GLUCOSE: 99 MG/DL (ref 65–99)
HDL CHOLESTEROL: 53 MG/DL
HEMATOCRIT: 45.9 % (ref 38.5–50)
HEMOGLOBIN: 15 G/DL (ref 13.2–17.1)
LDL-CHOLESTEROL: 218 MG/DL (CALC)
LYMPHOCYTES: 29.2 %
MCH: 30.5 PG (ref 27–33)
MCHC: 32.7 G/DL (ref 32–36)
MCV: 93.3 FL (ref 80–100)
MONOCYTES: 7.6 %
MPV: 10.3 FL (ref 7.5–12.5)
NEUTROPHILS: 58.6 %
NON-HDL CHOLESTEROL: 265 MG/DL (CALC)
PLATELET COUNT: 224 THOUSAND/UL (ref 140–400)
POTASSIUM: 4 MMOL/L (ref 3.5–5.3)
PROTEIN, TOTAL: 7 G/DL (ref 6.1–8.1)
PSA, TOTAL: 0.5 NG/ML
RDW: 13 % (ref 11–15)
RED BLOOD CELL COUNT: 4.92 MILLION/UL (ref 4.2–5.8)
SODIUM: 139 MMOL/L (ref 135–146)
TRIGLYCERIDES: 265 MG/DL
TSH W/REFLEX TO FT4: 2.79 MIU/L (ref 0.4–4.5)
WHITE BLOOD CELL COUNT: 6.7 THOUSAND/UL (ref 3.8–10.8)

## 2021-08-27 ENCOUNTER — OFFICE VISIT (OUTPATIENT)
Dept: PHYSICAL THERAPY | Facility: HOSPITAL | Age: 61
End: 2021-08-27
Attending: FAMILY MEDICINE
Payer: COMMERCIAL

## 2021-08-27 PROCEDURE — 97140 MANUAL THERAPY 1/> REGIONS: CPT

## 2021-08-27 PROCEDURE — 97110 THERAPEUTIC EXERCISES: CPT

## 2021-08-27 NOTE — PROGRESS NOTES
Discharge Summary  Pt has attended 15 visits in Physical Therapy.      Dx: Acute pain of left [employer] (Authorized # of Visits):  18 requested            Authorizing Physician: Dr. Paty Edmonds Next MD visit: none scheduled  Fall Risk: standard addressing this independently in his HEP as he has shown compliance during his hold periods as well as IND progress. Noted minor continued inc in mm tension L pec major, but improving with pec stretching in HEP.  At this time, patient has met or generally m elbow, anterior forearm, wrist  - low median nerve glides   - AP glide over distal humerus, gr III/IV  - AP glides global wrist with non-purposeful cavitation, gr III  - inferior clavicular glide, MWM with shoulder flexion and abd  - slow PROM abd to full AROM x 2 min  - standing abduction waves in mirror, TC cues to keep shoulder down/relaxed, multiple bouts  Therex  - UBE L4 x 2.5 min fwd/2.5 min retro  - shoulder flexion pulleys AROM x 2 min  - shoulder abduction pulleys AROM x 2 min  - standing lat stre - - - - - - -   - - - - - - - -   HEP: GTB rows, GTB TANNER ext, RTB ER, standing flexion to 90 3#, standing scaption to 90 3#, ER doorway stretch, flexion/abd pulleys, BTB bow and arrow, supported 90/90 ER, bent over row with BTB or 8#, SB flexion stretching

## 2021-09-01 ENCOUNTER — OFFICE VISIT (OUTPATIENT)
Dept: FAMILY MEDICINE CLINIC | Facility: CLINIC | Age: 61
End: 2021-09-01
Payer: COMMERCIAL

## 2021-09-01 VITALS
RESPIRATION RATE: 16 BRPM | HEIGHT: 72.44 IN | BODY MASS INDEX: 30.95 KG/M2 | WEIGHT: 231 LBS | TEMPERATURE: 98 F | DIASTOLIC BLOOD PRESSURE: 78 MMHG | HEART RATE: 70 BPM | SYSTOLIC BLOOD PRESSURE: 120 MMHG

## 2021-09-01 DIAGNOSIS — E78.5 DYSLIPIDEMIA: ICD-10-CM

## 2021-09-01 DIAGNOSIS — Z00.00 ROUTINE PHYSICAL EXAMINATION: Primary | ICD-10-CM

## 2021-09-01 DIAGNOSIS — R21 RASH: ICD-10-CM

## 2021-09-01 PROCEDURE — 3008F BODY MASS INDEX DOCD: CPT | Performed by: NURSE PRACTITIONER

## 2021-09-01 PROCEDURE — 3078F DIAST BP <80 MM HG: CPT | Performed by: NURSE PRACTITIONER

## 2021-09-01 PROCEDURE — 3074F SYST BP LT 130 MM HG: CPT | Performed by: NURSE PRACTITIONER

## 2021-09-01 PROCEDURE — 99396 PREV VISIT EST AGE 40-64: CPT | Performed by: NURSE PRACTITIONER

## 2021-09-01 NOTE — PROGRESS NOTES
Ga Marin. is a 61year old male who presents for a complete physical exam.     HPI:   Pt complains of some new rash sites to right hand and left elbow, itchy at times, especially at night, consistent with rash from visit with me on 8/17.  Stated old topically 2 (two) times daily. 15 g 0   • aspirin 81 MG Oral Tab Take 81 mg by mouth daily. • Glucosamine-Chondroit-Vit C-Mn (GLUCOSAMINE CHONDR 1500 COMPLX OR) Take  by mouth.         Past Medical History:   Diagnosis Date   • Acute sinusitis, unspecif or suicidal/homicidal ideations. EXAM:   /78   Pulse 70   Temp 97.5 °F (36.4 °C) (Temporal)   Resp 16   Ht 6' 0.44\" (1.84 m)   Wt 231 lb (104.8 kg)   BMI 30.95 kg/m²   Body mass index is 30.95 kg/m².    GENERAL: well developed, well nourished,in n if symptoms worsen. Verbalized understanding of instructions and agreeable to this plan of care. Dyslipidemia- Lipids elevated for 10 yr ASCVD risk of 9.7% per 2013 ACC/AHA algorithm. Recommended starting rosuvastatin.  Patient declined, discussed he w

## 2021-09-07 ENCOUNTER — TELEPHONE (OUTPATIENT)
Dept: FAMILY MEDICINE CLINIC | Facility: CLINIC | Age: 61
End: 2021-09-07

## 2021-09-07 DIAGNOSIS — R21 RASH: Primary | ICD-10-CM

## 2021-09-07 NOTE — TELEPHONE ENCOUNTER
Patient given contact information for Dr. Ruth Castanon M.D. He is also given name of Dr. Ruth Crystal M.D. in case unable to get in with [address] [facility].

## 2021-09-07 NOTE — TELEPHONE ENCOUNTER
Referral request Dr. Peggy Weiss M.D. Patient seen by Nanda Jones 9/1/2021 rash is worsening. Office notes from Nanda Jones NP 9/1/21  Rash- triamcinolone cream BID for 5-7 days.  Instructed to notify office if not improved in 7 days or if symp

## 2022-12-20 ENCOUNTER — TELEPHONE (OUTPATIENT)
Dept: FAMILY MEDICINE CLINIC | Facility: CLINIC | Age: 62
End: 2022-12-20

## 2022-12-20 DIAGNOSIS — Z13.0 SCREENING FOR BLOOD DISEASE: Primary | ICD-10-CM

## 2022-12-20 DIAGNOSIS — Z13.29 SCREENING FOR THYROID DISORDER: ICD-10-CM

## 2022-12-20 DIAGNOSIS — Z13.220 SCREENING FOR LIPID DISORDERS: ICD-10-CM

## 2022-12-20 DIAGNOSIS — Z12.5 SCREENING FOR PROSTATE CANCER: ICD-10-CM

## 2022-12-20 DIAGNOSIS — Z13.228 SCREENING FOR METABOLIC DISORDER: ICD-10-CM

## 2022-12-20 NOTE — TELEPHONE ENCOUNTER
Please enter lab orders for the patient's upcoming physical appointment. Physical scheduled: Your appointments     Date & Time Appointment Department [facility])    Nguyễn 10, 2023  8:00 AM CST Physical - Established with Virginia Sheffield, LOGAN [address] [facility], [address], [place]  (Deonte Power)            Carlos Faith [address] [facility] [id number]         Preferred lab: [facility]     The patient has been notified to complete fasting labs prior to their physical appointment.

## 2023-01-11 LAB
ABSOLUTE BASOPHILS: 52 CELLS/UL (ref 0–200)
ABSOLUTE EOSINOPHILS: 172 CELLS/UL (ref 15–500)
ABSOLUTE LYMPHOCYTES: 1830 CELLS/UL (ref 850–3900)
ABSOLUTE MONOCYTES: 442 CELLS/UL (ref 200–950)
ABSOLUTE NEUTROPHILS: 2704 CELLS/UL (ref 1500–7800)
ALBUMIN/GLOBULIN RATIO: 1.8 (CALC) (ref 1–2.5)
ALBUMIN: 4.4 G/DL (ref 3.6–5.1)
ALKALINE PHOSPHATASE: 80 U/L (ref 35–144)
ALT: 22 U/L (ref 9–46)
AST: 21 U/L (ref 10–35)
BASOPHILS: 1 %
BILIRUBIN, TOTAL: 0.3 MG/DL (ref 0.2–1.2)
BUN: 22 MG/DL (ref 7–25)
CALCIUM: 9.3 MG/DL (ref 8.6–10.3)
CARBON DIOXIDE: 24 MMOL/L (ref 20–32)
CHLORIDE: 103 MMOL/L (ref 98–110)
CHOL/HDLC RATIO: 7.3 (CALC)
CHOLESTEROL, TOTAL: 308 MG/DL
CREATININE: 1.13 MG/DL (ref 0.7–1.35)
EGFR: 73 ML/MIN/1.73M2
EOSINOPHILS: 3.3 %
GLOBULIN: 2.5 G/DL (CALC) (ref 1.9–3.7)
GLUCOSE: 117 MG/DL (ref 65–99)
HDL CHOLESTEROL: 42 MG/DL
HEMATOCRIT: 43.1 % (ref 38.5–50)
HEMOGLOBIN: 14.7 G/DL (ref 13.2–17.1)
LYMPHOCYTES: 35.2 %
MCH: 31.1 PG (ref 27–33)
MCHC: 34.1 G/DL (ref 32–36)
MCV: 91.3 FL (ref 80–100)
MONOCYTES: 8.5 %
MPV: 11.3 FL (ref 7.5–12.5)
NEUTROPHILS: 52 %
NON-HDL CHOLESTEROL: 266 MG/DL (CALC)
PLATELET COUNT: 217 THOUSAND/UL (ref 140–400)
POTASSIUM: 4.2 MMOL/L (ref 3.5–5.3)
PROTEIN, TOTAL: 6.9 G/DL (ref 6.1–8.1)
RDW: 12.7 % (ref 11–15)
RED BLOOD CELL COUNT: 4.72 MILLION/UL (ref 4.2–5.8)
SODIUM: 137 MMOL/L (ref 135–146)
TOTAL PSA: 0.6 NG/ML
TRIGLYCERIDES: 552 MG/DL
TSH W/REFLEX TO FT4: 3.91 MIU/L (ref 0.4–4.5)
WHITE BLOOD CELL COUNT: 5.2 THOUSAND/UL (ref 3.8–10.8)

## 2023-01-17 ENCOUNTER — OFFICE VISIT (OUTPATIENT)
Dept: FAMILY MEDICINE CLINIC | Facility: CLINIC | Age: 63
End: 2023-01-17
Payer: COMMERCIAL

## 2023-01-17 VITALS
DIASTOLIC BLOOD PRESSURE: 70 MMHG | TEMPERATURE: 97 F | OXYGEN SATURATION: 99 % | HEART RATE: 87 BPM | SYSTOLIC BLOOD PRESSURE: 122 MMHG | BODY MASS INDEX: 31.24 KG/M2 | RESPIRATION RATE: 16 BRPM | HEIGHT: 71.26 IN | WEIGHT: 225.63 LBS

## 2023-01-17 DIAGNOSIS — E78.00 PURE HYPERCHOLESTEROLEMIA: ICD-10-CM

## 2023-01-17 DIAGNOSIS — Z00.00 ROUTINE PHYSICAL EXAMINATION: Primary | ICD-10-CM

## 2023-01-17 DIAGNOSIS — Z12.11 SCREENING FOR COLON CANCER: ICD-10-CM

## 2023-01-17 DIAGNOSIS — N52.9 ERECTILE DYSFUNCTION, UNSPECIFIED ERECTILE DYSFUNCTION TYPE: ICD-10-CM

## 2023-01-17 DIAGNOSIS — R73.01 ELEVATED FASTING GLUCOSE: ICD-10-CM

## 2023-01-17 PROCEDURE — 3078F DIAST BP <80 MM HG: CPT | Performed by: NURSE PRACTITIONER

## 2023-01-17 PROCEDURE — 90715 TDAP VACCINE 7 YRS/> IM: CPT | Performed by: NURSE PRACTITIONER

## 2023-01-17 PROCEDURE — 90471 IMMUNIZATION ADMIN: CPT | Performed by: NURSE PRACTITIONER

## 2023-01-17 PROCEDURE — 3008F BODY MASS INDEX DOCD: CPT | Performed by: NURSE PRACTITIONER

## 2023-01-17 PROCEDURE — 99396 PREV VISIT EST AGE 40-64: CPT | Performed by: NURSE PRACTITIONER

## 2023-01-17 PROCEDURE — 3074F SYST BP LT 130 MM HG: CPT | Performed by: NURSE PRACTITIONER

## 2023-01-17 RX ORDER — ROSUVASTATIN CALCIUM 10 MG/1
10 TABLET, COATED ORAL NIGHTLY
Qty: 90 TABLET | Refills: 1 | Status: SHIPPED | OUTPATIENT
Start: 2023-01-17

## 2023-01-17 RX ORDER — SILDENAFIL 50 MG/1
50 TABLET, FILM COATED ORAL
Qty: 8 TABLET | Refills: 3 | Status: SHIPPED | OUTPATIENT
Start: 2023-01-17

## 2023-02-15 PROBLEM — Z80.0 FAMILY HISTORY OF MALIGNANT NEOPLASM OF DIGESTIVE ORGAN: Status: ACTIVE | Noted: 2023-02-15

## 2023-02-15 PROBLEM — Z86.0100 PERSONAL HISTORY OF COLONIC POLYPS: Status: ACTIVE | Noted: 2023-02-15

## 2023-02-15 PROBLEM — D12.3 BENIGN NEOPLASM OF TRANSVERSE COLON: Status: ACTIVE | Noted: 2023-02-15

## 2023-02-15 PROBLEM — D12.0 BENIGN NEOPLASM OF CECUM: Status: ACTIVE | Noted: 2023-02-15

## 2023-02-15 PROBLEM — Z86.010 PERSONAL HISTORY OF COLONIC POLYPS: Status: ACTIVE | Noted: 2023-02-15

## 2023-05-23 LAB
ALBUMIN/GLOBULIN RATIO: 2 (CALC) (ref 1–2.5)
ALBUMIN: 4.4 G/DL (ref 3.6–5.1)
ALKALINE PHOSPHATASE: 81 U/L (ref 35–144)
ALT: 26 U/L (ref 9–46)
AST: 19 U/L (ref 10–35)
BILIRUBIN, TOTAL: 0.6 MG/DL (ref 0.2–1.2)
BUN: 18 MG/DL (ref 7–25)
CALCIUM: 9.3 MG/DL (ref 8.6–10.3)
CARBON DIOXIDE: 26 MMOL/L (ref 20–32)
CHLORIDE: 103 MMOL/L (ref 98–110)
CHOL/HDLC RATIO: 3.8 (CALC)
CHOLESTEROL, TOTAL: 203 MG/DL
CREATININE: 1.01 MG/DL (ref 0.7–1.35)
EGFR: 84 ML/MIN/1.73M2
GLOBULIN: 2.2 G/DL (CALC) (ref 1.9–3.7)
GLUCOSE: 96 MG/DL (ref 65–99)
HDL CHOLESTEROL: 54 MG/DL
LDL-CHOLESTEROL: 120 MG/DL (CALC)
NON-HDL CHOLESTEROL: 149 MG/DL (CALC)
POTASSIUM: 4.2 MMOL/L (ref 3.5–5.3)
PROTEIN, TOTAL: 6.6 G/DL (ref 6.1–8.1)
SODIUM: 138 MMOL/L (ref 135–146)
TRIGLYCERIDES: 170 MG/DL

## 2023-05-24 RX ORDER — ROSUVASTATIN CALCIUM 10 MG/1
10 TABLET, COATED ORAL NIGHTLY
Qty: 90 TABLET | Refills: 1 | Status: SHIPPED | OUTPATIENT
Start: 2023-05-24

## 2024-01-22 ENCOUNTER — TELEPHONE (OUTPATIENT)
Dept: FAMILY MEDICINE CLINIC | Facility: CLINIC | Age: 64
End: 2024-01-22

## 2024-01-22 DIAGNOSIS — Z00.00 LABORATORY EXAM ORDERED AS PART OF ROUTINE GENERAL MEDICAL EXAMINATION: ICD-10-CM

## 2024-01-22 DIAGNOSIS — Z12.5 PROSTATE CANCER SCREENING: ICD-10-CM

## 2024-01-22 DIAGNOSIS — E78.00 PURE HYPERCHOLESTEROLEMIA: Primary | ICD-10-CM

## 2024-01-22 NOTE — TELEPHONE ENCOUNTER
Please enter lab orders for the patient's upcoming physical appointment.     Physical scheduled:   Your appointments       Date & Time Appointment Department ([place])    Feb 07, 2024  2:30 PM CST Physical - Established with Chiki Harper MD [facility] ([facility])              [facility]  [address] [place] [id number]  [phone]           Preferred lab: [facility]     The patient has been notified to complete fasting labs prior to their physical appointment.

## 2024-01-29 LAB
ALBUMIN/GLOBULIN RATIO: 1.9 (CALC) (ref 1–2.5)
ALBUMIN: 4.4 G/DL (ref 3.6–5.1)
ALKALINE PHOSPHATASE: 73 U/L (ref 35–144)
ALT: 20 U/L (ref 9–46)
AST: 22 U/L (ref 10–35)
BILIRUBIN, TOTAL: 0.6 MG/DL (ref 0.2–1.2)
BUN: 21 MG/DL (ref 7–25)
CALCIUM: 9.5 MG/DL (ref 8.6–10.3)
CARBON DIOXIDE: 25 MMOL/L (ref 20–32)
CHLORIDE: 106 MMOL/L (ref 98–110)
CHOL/HDLC RATIO: 3.2 (CALC)
CHOLESTEROL, TOTAL: 174 MG/DL
CREATININE: 1.23 MG/DL (ref 0.7–1.35)
EGFR: 66 ML/MIN/1.73M2
GLOBULIN: 2.3 G/DL (CALC) (ref 1.9–3.7)
GLUCOSE: 104 MG/DL (ref 65–99)
HDL CHOLESTEROL: 55 MG/DL
HEMATOCRIT: 42.7 % (ref 38.5–50)
HEMOGLOBIN A1C: 5.3 % OF TOTAL HGB
HEMOGLOBIN: 14.1 G/DL (ref 13.2–17.1)
LDL-CHOLESTEROL: 92 MG/DL (CALC)
MCH: 31.1 PG (ref 27–33)
MCHC: 33 G/DL (ref 32–36)
MCV: 94.1 FL (ref 80–100)
MPV: 11.1 FL (ref 7.5–12.5)
NON-HDL CHOLESTEROL: 119 MG/DL (CALC)
PLATELET COUNT: 169 THOUSAND/UL (ref 140–400)
POTASSIUM: 4.4 MMOL/L (ref 3.5–5.3)
PROTEIN, TOTAL: 6.7 G/DL (ref 6.1–8.1)
RDW: 12.9 % (ref 11–15)
RED BLOOD CELL COUNT: 4.54 MILLION/UL (ref 4.2–5.8)
SODIUM: 143 MMOL/L (ref 135–146)
TOTAL PSA: 0.5 NG/ML
TRIGLYCERIDES: 177 MG/DL
TSH W/REFLEX TO FT4: 3.77 MIU/L (ref 0.4–4.5)
WHITE BLOOD CELL COUNT: 5.5 THOUSAND/UL (ref 3.8–10.8)

## 2024-02-07 ENCOUNTER — OFFICE VISIT (OUTPATIENT)
Dept: FAMILY MEDICINE CLINIC | Facility: CLINIC | Age: 64
End: 2024-02-07
Payer: COMMERCIAL

## 2024-02-07 VITALS
HEART RATE: 74 BPM | WEIGHT: 241.25 LBS | SYSTOLIC BLOOD PRESSURE: 130 MMHG | BODY MASS INDEX: 33.4 KG/M2 | DIASTOLIC BLOOD PRESSURE: 80 MMHG | RESPIRATION RATE: 16 BRPM | HEIGHT: 71.26 IN | OXYGEN SATURATION: 98 %

## 2024-02-07 DIAGNOSIS — N52.9 ERECTILE DYSFUNCTION, UNSPECIFIED ERECTILE DYSFUNCTION TYPE: ICD-10-CM

## 2024-02-07 DIAGNOSIS — M79.18 GLUTEAL PAIN: ICD-10-CM

## 2024-02-07 DIAGNOSIS — Z00.00 ANNUAL PHYSICAL EXAM: Primary | ICD-10-CM

## 2024-02-07 DIAGNOSIS — E78.00 PURE HYPERCHOLESTEROLEMIA: ICD-10-CM

## 2024-02-07 PROBLEM — D12.3 BENIGN NEOPLASM OF TRANSVERSE COLON: Status: RESOLVED | Noted: 2023-02-15 | Resolved: 2024-02-07

## 2024-02-07 PROBLEM — D12.0 BENIGN NEOPLASM OF CECUM: Status: RESOLVED | Noted: 2023-02-15 | Resolved: 2024-02-07

## 2024-02-07 PROBLEM — Z86.0100 PERSONAL HISTORY OF COLONIC POLYPS: Status: RESOLVED | Noted: 2023-02-15 | Resolved: 2024-02-07

## 2024-02-07 PROBLEM — Z86.010 PERSONAL HISTORY OF COLONIC POLYPS: Status: RESOLVED | Noted: 2023-02-15 | Resolved: 2024-02-07

## 2024-02-07 RX ORDER — ROSUVASTATIN CALCIUM 10 MG/1
10 TABLET, COATED ORAL NIGHTLY
Qty: 90 TABLET | Refills: 3 | Status: SHIPPED | OUTPATIENT
Start: 2024-02-07

## 2024-02-07 RX ORDER — SILDENAFIL 50 MG/1
50 TABLET, FILM COATED ORAL
Qty: 30 TABLET | Refills: 1 | Status: SHIPPED | OUTPATIENT
Start: 2024-02-07

## 2024-02-07 NOTE — PROGRESS NOTES
Chief Complaint   Patient presents with    Physical     Patient here for physical      HPI:   Iban Winkler Jr. is a 63 year old male who presents for a complete physical exam.     Last colonoscopy:  2/2023.  Repeat 5 yrs.    Last PSA:  0.5  Immunizations: TDaP 2023.     HLD - on Crestor 10mg.  Also cutting back caffeine.  Eating less red meat.    Weight up.     L hip/buttocks.  Injured walking in January.  Still having some pains fro this.  When exercising, pains in the buttocks and down leg.      Weight - up some.  16lbs from a year ago.  Most of it in the last few months.    Diet better.  No added salt.  Stable eating out.  Travels for work, but this is not new.  Swelling in legs, fingers.   Wt Readings from Last 6 Encounters:   02/07/24 241 lb 4 oz (109.4 kg)   01/30/23 235 lb (106.6 kg)   01/17/23 225 lb 9.6 oz (102.3 kg)   09/01/21 231 lb (104.8 kg)   08/17/21 233 lb 6 oz (105.9 kg)   03/11/21 225 lb 6 oz (102.2 kg)     Body mass index is 33.4 kg/m².     Chemistry Labs:   Lab Results   Component Value Date/Time     (H) 01/26/2024 05:39 AM     01/26/2024 05:39 AM    K 4.4 01/26/2024 05:39 AM     01/26/2024 05:39 AM    CO2 25 01/26/2024 05:39 AM    CREATSERUM 1.23 01/26/2024 05:39 AM    CA 9.5 01/26/2024 05:39 AM    ALB 4.4 01/26/2024 05:39 AM    TP 6.7 01/26/2024 05:39 AM    ALKPHO 73 01/26/2024 05:39 AM    AST 22 01/26/2024 05:39 AM    ALT 20 01/26/2024 05:39 AM    BILT 0.6 01/26/2024 05:39 AM          Cholesterol  (most recent labs)   Lab Results   Component Value Date/Time    CHOLEST 174 01/26/2024 05:39 AM    HDL 55 01/26/2024 05:39 AM    LDL 92 01/26/2024 05:39 AM    TRIG 177 (H) 01/26/2024 05:39 AM      No results found for: \"PSA\"      Current Outpatient Medications   Medication Sig Dispense Refill    rosuvastatin 10 MG Oral Tab Take 1 tablet (10 mg total) by mouth nightly. 90 tablet 1    Sildenafil Citrate 50 MG Oral Tab Take 1 tablet (50 mg total) by mouth daily as needed for Erectile  Dysfunction. 8 tablet 3    aspirin 81 MG Oral Tab Take 1 tablet (81 mg total) by mouth daily.      Glucosamine-Chondroit-Vit C-Mn (GLUCOSAMINE CHONDR 1500 COMPLX OR) Take  by mouth.        Past Medical History:   Diagnosis Date    Acute sinusitis, unspecified 2011    High cholesterol     Wears glasses       Past Surgical History:   Procedure Laterality Date    COLONOSCOPY  12      Family History   Problem Relation Age of Onset    Heart Attack Brother         age 39,     Cancer Mother         Colon age 73    Eye Problems Mother         Macular degeneration    Cancer Father         Pancreatic age 62    Hypertension Sister       Social History:  Social History     Socioeconomic History    Marital status:    Occupational History    Occupation: sales   Tobacco Use    Smoking status: Former     Packs/day: 0.50     Years: 10.00     Additional pack years: 0.00     Total pack years: 5.00     Types: Cigarettes     Quit date: 2007     Years since quittin.1    Smokeless tobacco: Never   Substance and Sexual Activity    Alcohol use: Yes     Alcohol/week: 11.0 standard drinks of alcohol     Types: 8 Cans of beer, 1 Shots of liquor, 2 Standard drinks or equivalent per week     Comment: 8-12 drinks a week    Drug use: No   Other Topics Concern    Caffeine Concern Yes     Comment: 3-4 cups a month    Exercise No    Seat Belt Yes      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   : yes. Children: 6 children.   Exercise: elliptical.   Diet: improved.       REVIEW OF SYSTEMS:     All systems reviewed, negative other than noted above.    EXAM:   /80   Pulse 74   Resp 16   Ht 5' 11.26\" (1.81 m)   Wt 241 lb 4 oz (109.4 kg)   SpO2 98%   BMI 33.40 kg/m²   Body mass index is 33.4 kg/m².     Wt Readings from Last 6 Encounters:   24 241 lb 4 oz (109.4 kg)   23 235 lb (106.6 kg)   23 225 lb 9.6 oz (102.3 kg)   21 231  lb (104.8 kg)   08/17/21 233 lb 6 oz (105.9 kg)   03/11/21 225 lb 6 oz (102.2 kg)      General appearance: alert, appears stated age and cooperative  Eyes: conjunctivae/corneas clear. PERRL, EOM's intact.   Ears: normal TM's and external ear canals both ears  Neck: no adenopathy, no JVD, supple, symmetrical, trachea midline and thyroid not enlarged, symmetric, no tenderness/mass/nodules  Lungs: clear to auscultation bilaterally  Heart: S1, S2 normal, no murmur, click, rub or gallop, regular rate and rhythm  Abdomen: soft, non-tender; bowel sounds normal; no masses,  no organomegaly  Extremities: extremities normal, atraumatic, no cyanosis or edema  Pulses: 2+ and symmetric  Neurologic: Alert and oriented X 3, normal strength and tone. Normal symmetric reflexes. Normal coordination and gait     ASSESSMENT AND PLAN:     Iban Winkler Jr. was seen in the office today:  had concerns including Physical (Patient here for physical).    1. Annual physical exam  Overall well  Significant improvement in health overall.  Feeling better, but weight up in the last couple of weeks or months  Working on getting this down  Colonoscopy up-to-date  Discussed immunizations    2. Gluteal pain  Onset of gluteal pain in January from exercising  Has limited him since then  Noticed weight increase since this time.  Need to get this improved.  Recommend physical therapy  - Physical Therapy Referral - [facility]    3. Pure hypercholesterolemia  Cholesterol significantly improved.  Continue rosuvastatin.  Order placed  - rosuvastatin 10 MG Oral Tab; Take 1 tablet (10 mg total) by mouth nightly.  Dispense: 90 tablet; Refill: 3    4. Erectile dysfunction, unspecified erectile dysfunction type  As needed use.  Effective when needed  - Sildenafil Citrate 50 MG Oral Tab; Take 1 tablet (50 mg total) by mouth daily as needed for Erectile Dysfunction.  Dispense: 30 tablet; Refill: 1          Chiki Harper M.D.   [facility]  3  02/07/24      Discussed with the patient in person that additional conditions managed outside of the normal physical, and this may incur co-payments as dictated by their insurance provider.

## 2025-01-29 ENCOUNTER — TELEPHONE (OUTPATIENT)
Dept: FAMILY MEDICINE CLINIC | Facility: CLINIC | Age: 65
End: 2025-01-29

## 2025-01-29 DIAGNOSIS — E78.00 PURE HYPERCHOLESTEROLEMIA: ICD-10-CM

## 2025-01-29 RX ORDER — ROSUVASTATIN CALCIUM 10 MG/1
10 TABLET, COATED ORAL NIGHTLY
Qty: 30 TABLET | Refills: 0 | Status: SHIPPED | OUTPATIENT
Start: 2025-01-29

## 2025-01-29 RX ORDER — ROSUVASTATIN CALCIUM 10 MG/1
10 TABLET, COATED ORAL NIGHTLY
Qty: 90 TABLET | Refills: 3 | OUTPATIENT
Start: 2025-01-29

## 2025-01-29 RX ORDER — ROSUVASTATIN CALCIUM 10 MG/1
10 TABLET, COATED ORAL NIGHTLY
Qty: 30 TABLET | Refills: 0 | Status: SHIPPED | OUTPATIENT
Start: 2025-01-29 | End: 2025-01-29

## 2025-01-29 NOTE — TELEPHONE ENCOUNTER
[facility] is calling for a new prescription to be faxed.    rosuvastatin 10 MG Oral Tab     Phone [phone]  Fax [phone]

## 2025-01-29 NOTE — TELEPHONE ENCOUNTER
About due for med.  We can fill 30 days and allow him to schedule.  Get labs prior to appt, like usual.

## 2025-01-29 NOTE — TELEPHONE ENCOUNTER
Pt needs this med sent to [facility] - [facility], [place] - [address] [place] [address] [phone], [phone] [[id number]]

## 2025-02-01 RX ORDER — ROSUVASTATIN CALCIUM 10 MG/1
10 TABLET, COATED ORAL NIGHTLY
Qty: 30 TABLET | Refills: 0 | OUTPATIENT
Start: 2025-02-01

## 2025-02-01 NOTE — TELEPHONE ENCOUNTER
Signed 3 days ago    Requested Prescriptions     Refused Prescriptions Disp Refills    rosuvastatin 10 MG Oral Tab 30 tablet 0     Sig: Take 1 tablet (10 mg total) by mouth nightly.     Refused By: CHRISTINA DE LA TORRE     Reason for Refusal: Duplicate refill request

## 2025-02-15 ENCOUNTER — TELEPHONE (OUTPATIENT)
Dept: FAMILY MEDICINE CLINIC | Facility: CLINIC | Age: 65
End: 2025-02-15

## 2025-02-15 DIAGNOSIS — Z00.00 ANNUAL PHYSICAL EXAM: ICD-10-CM

## 2025-02-15 DIAGNOSIS — Z00.00 LABORATORY EXAM ORDERED AS PART OF ROUTINE GENERAL MEDICAL EXAMINATION: Primary | ICD-10-CM

## 2025-02-15 NOTE — TELEPHONE ENCOUNTER
Pt was told by Maude that his labs would be ordered. Pt went to [facility] this morning for appt after fasting and orders were not there. Placed orders.

## 2025-02-15 NOTE — TELEPHONE ENCOUNTER
Please enter lab orders for the patient's upcoming physical appointment.     Physical scheduled:   Your appointments       Date & Time Appointment Department ([place])    Feb 27, 2025 8:00 AM CST Physical - Established with Chiki Harper MD [facility] ([facility])              [facility]  [address]  [facility] [address]  [phone]           Preferred lab: [facility]     The patient has been notified to complete fasting labs prior to their physical appointment.

## 2025-02-24 LAB
ABSOLUTE BASOPHILS: 50 CELLS/UL (ref 0–200)
ABSOLUTE EOSINOPHILS: 363 CELLS/UL (ref 15–500)
ABSOLUTE LYMPHOCYTES: 1683 CELLS/UL (ref 850–3900)
ABSOLUTE MONOCYTES: 413 CELLS/UL (ref 200–950)
ABSOLUTE NEUTROPHILS: 2992 CELLS/UL (ref 1500–7800)
ALBUMIN/GLOBULIN RATIO: 2.2 (CALC) (ref 1–2.5)
ALBUMIN: 4.6 G/DL (ref 3.6–5.1)
ALKALINE PHOSPHATASE: 66 U/L (ref 35–144)
ALT: 27 U/L (ref 9–46)
AST: 21 U/L (ref 10–35)
BASOPHILS: 0.9 %
BILIRUBIN, TOTAL: 0.4 MG/DL (ref 0.2–1.2)
BUN: 16 MG/DL (ref 7–25)
CALCIUM: 9.4 MG/DL (ref 8.6–10.3)
CARBON DIOXIDE: 25 MMOL/L (ref 20–32)
CHLORIDE: 106 MMOL/L (ref 98–110)
CHOL/HDLC RATIO: 2.9 (CALC)
CHOLESTEROL, TOTAL: 183 MG/DL
CREATININE: 1 MG/DL (ref 0.7–1.35)
EGFR: 84 ML/MIN/1.73M2
EOSINOPHILS: 6.6 %
GLOBULIN: 2.1 G/DL (CALC) (ref 1.9–3.7)
GLUCOSE: 113 MG/DL (ref 65–99)
HDL CHOLESTEROL: 64 MG/DL
HEMATOCRIT: 45.4 % (ref 38.5–50)
HEMOGLOBIN A1C: 5.5 % OF TOTAL HGB
HEMOGLOBIN: 14.8 G/DL (ref 13.2–17.1)
LDL-CHOLESTEROL: 100 MG/DL (CALC)
LYMPHOCYTES: 30.6 %
MCH: 31.9 PG (ref 27–33)
MCHC: 32.6 G/DL (ref 32–36)
MCV: 97.8 FL (ref 80–100)
MONOCYTES: 7.5 %
MPV: 11.7 FL (ref 7.5–12.5)
NEUTROPHILS: 54.4 %
NON-HDL CHOLESTEROL: 119 MG/DL (CALC)
PLATELET COUNT: 187 THOUSAND/UL (ref 140–400)
POTASSIUM: 4.7 MMOL/L (ref 3.5–5.3)
PROTEIN, TOTAL: 6.7 G/DL (ref 6.1–8.1)
RDW: 12.7 % (ref 11–15)
RED BLOOD CELL COUNT: 4.64 MILLION/UL (ref 4.2–5.8)
SODIUM: 141 MMOL/L (ref 135–146)
TOTAL PSA: 0.8 NG/ML
TRIGLYCERIDES: 93 MG/DL
TSH W/REFLEX TO FT4: 2.02 MIU/L (ref 0.4–4.5)
WHITE BLOOD CELL COUNT: 5.5 THOUSAND/UL (ref 3.8–10.8)

## 2025-02-27 ENCOUNTER — OFFICE VISIT (OUTPATIENT)
Dept: FAMILY MEDICINE CLINIC | Facility: CLINIC | Age: 65
End: 2025-02-27
Payer: COMMERCIAL

## 2025-02-27 VITALS
RESPIRATION RATE: 17 BRPM | OXYGEN SATURATION: 97 % | SYSTOLIC BLOOD PRESSURE: 120 MMHG | HEART RATE: 72 BPM | HEIGHT: 71.26 IN | BODY MASS INDEX: 33.45 KG/M2 | DIASTOLIC BLOOD PRESSURE: 80 MMHG | WEIGHT: 241.63 LBS

## 2025-02-27 DIAGNOSIS — Z00.00 ANNUAL PHYSICAL EXAM: Primary | ICD-10-CM

## 2025-02-27 DIAGNOSIS — E78.00 PURE HYPERCHOLESTEROLEMIA: ICD-10-CM

## 2025-02-27 PROCEDURE — 99396 PREV VISIT EST AGE 40-64: CPT | Performed by: FAMILY MEDICINE

## 2025-02-27 RX ORDER — ROSUVASTATIN CALCIUM 10 MG/1
10 TABLET, COATED ORAL NIGHTLY
Qty: 90 TABLET | Refills: 3 | Status: SHIPPED | OUTPATIENT
Start: 2025-02-27

## 2025-02-27 NOTE — PROGRESS NOTES
Chief Complaint   Patient presents with    Well Adult     Physical  Coughing with flem occasionally in the morning      HPI:   Iban Winkler Jr. is a 64 year old male who presents for a complete physical exam.     Last colonoscopy:  2/2023.  Repeat 5 yrs.   Last PSA:  0.8  Immunizations: TDaP 2023.      HLD - on Crestor.  +family history of heart disease.     Cough - productive of phlegm.        Wt Readings from Last 6 Encounters:   02/27/25 241 lb 9.6 oz (109.6 kg)   02/07/24 241 lb 4 oz (109.4 kg)   01/30/23 235 lb (106.6 kg)   01/17/23 225 lb 9.6 oz (102.3 kg)   09/01/21 231 lb (104.8 kg)   08/17/21 233 lb 6 oz (105.9 kg)     Body mass index is 33.45 kg/m².     Chemistry Labs:   Lab Results   Component Value Date/Time     (H) 02/22/2025 07:43 AM     02/22/2025 07:43 AM    K 4.7 02/22/2025 07:43 AM     02/22/2025 07:43 AM    CO2 25 02/22/2025 07:43 AM    CREATSERUM 1.00 02/22/2025 07:43 AM    CA 9.4 02/22/2025 07:43 AM    ALB 4.6 02/22/2025 07:43 AM    TP 6.7 02/22/2025 07:43 AM    ALKPHO 66 02/22/2025 07:43 AM    AST 21 02/22/2025 07:43 AM    ALT 27 02/22/2025 07:43 AM    BILT 0.4 02/22/2025 07:43 AM          Cholesterol  (most recent labs)   Lab Results   Component Value Date/Time    CHOLEST 183 02/22/2025 07:43 AM    HDL 64 02/22/2025 07:43 AM     (H) 02/22/2025 07:43 AM    TRIG 93 02/22/2025 07:43 AM      No results found for: \"PSA\"      Current Outpatient Medications   Medication Sig Dispense Refill    rosuvastatin 10 MG Oral Tab Take 1 tablet (10 mg total) by mouth nightly. 90 tablet 0    Sildenafil Citrate 50 MG Oral Tab Take 1 tablet (50 mg total) by mouth daily as needed for Erectile Dysfunction. 30 tablet 1    aspirin 81 MG Oral Tab Take 1 tablet (81 mg total) by mouth daily.      Glucosamine-Chondroit-Vit C-Mn (GLUCOSAMINE CHONDR 1500 COMPLX OR) Take  by mouth.        Past Medical History:    Acute sinusitis, unspecified    High cholesterol    Wears glasses      Past Surgical  History:   Procedure Laterality Date    Colonoscopy  12      Family History   Problem Relation Age of Onset    Heart Attack Brother         age 39,     Cancer Mother         Colon age 73    Eye Problems Mother         Macular degeneration    Cancer Father         Pancreatic age 62    Hypertension Sister       Social History:  Social History     Socioeconomic History    Marital status:    Occupational History    Occupation: sales   Tobacco Use    Smoking status: Former     Current packs/day: 0.00     Average packs/day: 0.5 packs/day for 10.0 years (5.0 ttl pk-yrs)     Types: Cigarettes     Start date: 1997     Quit date: 2007     Years since quittin.1    Smokeless tobacco: Never   Substance and Sexual Activity    Alcohol use: Yes     Alcohol/week: 11.0 standard drinks of alcohol     Types: 8 Cans of beer, 1 Shots of liquor, 2 Standard drinks or equivalent per week     Comment: 8-12 drinks a week    Drug use: No   Other Topics Concern    Caffeine Concern Yes     Comment: 2 cups of coffee a day    Exercise No    Seat Belt Yes      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   Exercise: elliptical  Diet: better in summer     REVIEW OF SYSTEMS:     All systems reviewed, negative other than noted above.    EXAM:   /80   Pulse 72   Resp 17   Ht 5' 11.26\" (1.81 m)   Wt 241 lb 9.6 oz (109.6 kg)   SpO2 97%   BMI 33.45 kg/m²   Body mass index is 33.45 kg/m².     General appearance: alert, appears stated age and cooperative  Eyes: conjunctivae/corneas clear. PERRL, EOM's intact.   Ears: normal TM's and external ear canals both ears  Neck: no adenopathy, no JVD, supple, symmetrical, trachea midline and thyroid not enlarged, symmetric, no tenderness/mass/nodules  Lungs: clear to auscultation bilaterally  Heart: S1, S2 normal, no murmur, click, rub or gallop, regular rate and rhythm  Abdomen: soft, non-tender; bowel sounds normal; no masses,  no  organomegaly  Extremities: extremities normal, atraumatic, no cyanosis or edema  Pulses: 2+ and symmetric  Neurologic: Alert and oriented X 3, normal strength and tone. Normal symmetric reflexes. Normal coordination and gait     ASSESSMENT AND PLAN:     Iban PANG Winkler  was seen in the office today:  had concerns including Well Adult (Physical/Coughing with flem occasionally in the morning).    1. Annual physical exam  Overall well  Need to get weight down, and keep it.  Due to lose about 40 lbs.  Work on being consistent.   C-scope UTD  Labs reviewed.   Immunizations discussed    2. Pure hypercholesterolemia  +family history of early heart disease  On statin  Typically controlled.   - rosuvastatin 10 MG Oral Tab; Take 1 tablet (10 mg total) by mouth nightly.  Dispense: 90 tablet; Refill: 3          Chiki Harper M.D.   [facility] 3  02/27/25        Note to patient: The 21 Century Cures Act makes medical notes like these available to patients in the interest of transparency. However, be advised this is a medical document. It is intended as peer to peer communication. It is written in medical language and may contain abbreviations or verbiage that are unfamiliar. It may appear blunt or direct. Medical documents are intended to carry relevant information, facts as evident, and the clinical opinion of the practitioner.

## (undated) NOTE — LETTER
Patient Name: Alvarez August. YOB: 1960          MRN :  [id number]  Date:  8/27/2021  Referring Physician:  Eleni Valencia    Discharge Summary  Pt has attended 15 visits in Physical Therapy.      Dx: Acute pain of left shoulder           Ins with a minor strength deficit of ERs on L; this is being addressed in his IND HEP. Do not have concern regarding allowing him to continue addressing this independently in his HEP as he has shown compliance during his hold periods as well as IND progress.  [facility]